# Patient Record
Sex: MALE | Race: BLACK OR AFRICAN AMERICAN | NOT HISPANIC OR LATINO | Employment: OTHER | URBAN - METROPOLITAN AREA
[De-identification: names, ages, dates, MRNs, and addresses within clinical notes are randomized per-mention and may not be internally consistent; named-entity substitution may affect disease eponyms.]

---

## 2017-09-18 ENCOUNTER — TELEPHONE (OUTPATIENT)
Dept: SURGERY | Facility: CLINIC | Age: 67
End: 2017-09-18

## 2017-09-20 ENCOUNTER — INITIAL CONSULT (OUTPATIENT)
Dept: SURGICAL ONCOLOGY | Facility: CLINIC | Age: 67
End: 2017-09-20
Payer: MEDICARE

## 2017-09-20 VITALS
WEIGHT: 145.06 LBS | HEART RATE: 78 BPM | SYSTOLIC BLOOD PRESSURE: 132 MMHG | HEIGHT: 65 IN | BODY MASS INDEX: 24.17 KG/M2 | TEMPERATURE: 98 F | DIASTOLIC BLOOD PRESSURE: 67 MMHG

## 2017-09-20 DIAGNOSIS — C20 RECTAL CANCER METASTASIZED TO LIVER: ICD-10-CM

## 2017-09-20 DIAGNOSIS — C78.7 RECTAL CANCER METASTASIZED TO LIVER: ICD-10-CM

## 2017-09-20 PROCEDURE — 1159F MED LIST DOCD IN RCRD: CPT | Mod: ,,, | Performed by: SURGERY

## 2017-09-20 PROCEDURE — 99204 OFFICE O/P NEW MOD 45 MIN: CPT | Mod: S$PBB,,, | Performed by: SURGERY

## 2017-09-20 PROCEDURE — 99999 PR PBB SHADOW E&M-EST. PATIENT-LVL III: CPT | Mod: PBBFAC,,, | Performed by: SURGERY

## 2017-09-20 PROCEDURE — 99213 OFFICE O/P EST LOW 20 MIN: CPT | Mod: PBBFAC,PN | Performed by: SURGERY

## 2017-09-20 PROCEDURE — 1125F AMNT PAIN NOTED PAIN PRSNT: CPT | Mod: ,,, | Performed by: SURGERY

## 2017-09-20 RX ORDER — ALLOPURINOL 300 MG/1
300 TABLET ORAL
COMMUNITY
Start: 2014-08-25

## 2017-09-20 RX ORDER — AMLODIPINE BESYLATE 5 MG/1
5 TABLET ORAL
COMMUNITY
Start: 2015-03-11

## 2017-09-20 RX ORDER — ATENOLOL 50 MG/1
50 TABLET ORAL
COMMUNITY
Start: 2015-04-01

## 2017-09-20 RX ORDER — OMEPRAZOLE 40 MG/1
40 CAPSULE, DELAYED RELEASE ORAL
COMMUNITY
Start: 2015-04-09

## 2017-09-20 RX ORDER — SIMVASTATIN 40 MG/1
TABLET, FILM COATED ORAL
COMMUNITY
Start: 2014-11-03

## 2017-09-20 RX ORDER — CYPROHEPTADINE HYDROCHLORIDE 4 MG/1
4 TABLET ORAL
COMMUNITY
Start: 2015-03-11

## 2017-09-20 RX ORDER — TRAMADOL HYDROCHLORIDE 50 MG/1
50 TABLET ORAL
COMMUNITY

## 2017-09-20 RX ORDER — OXYBUTYNIN CHLORIDE 5 MG/1
5 TABLET ORAL
COMMUNITY
Start: 2014-11-25

## 2017-09-20 RX ORDER — LOSARTAN POTASSIUM 50 MG/1
50 TABLET ORAL
COMMUNITY
Start: 2014-04-25

## 2017-09-20 NOTE — LETTER
September 20, 2017      Manpreet Cazares MD  [address]  [address]  [facility] [address]           [facility]  [address]  [facility] [place] [facility] [address]  Phone: [phone]  Fax: [phone]          Patient: Rajat Ramires   MR Number: [id number]   YOB: 1950   Date of Visit: 9/20/2017       Dear Dr. Manpreet Cazares:    Thank you for referring Rajat Ramires to me for evaluation. Attached you will find relevant portions of my assessment and plan of care.    If you have questions, please do not hesitate to call me. I look forward to following Rajat Ramires along with you.    Sincerely,    Sander Jean MD    Enclosure  CC:  No Recipients    If you would like to receive this communication electronically, please contact [email or web address] or [phone] to request more information on [employer] access.    For providers and/or their staff who would like to refer a patient to [facility], please contact us through our one-stop-shop provider referral line, [facility], at [phone].    If you feel you have received this communication in error or would no longer like to receive these types of communications, please e-mail [email or web address]

## 2017-09-20 NOTE — PROGRESS NOTES
Recently diagnosed with locally advanced rectal cancer and is undergoing staging workup  To be presented at [facility] cancer conference today  MRI shows (on my review) four liver mets:  ---segment LUIS EDUARDO, 2.5 cm, abutting the middle hepatic vein (will probably require full left hep res)  ---segment 8, 1 cm and fairly peripheral  ---segment 6/7 watershed, 1 cm, fairly peripheral  ---left caudate, 1.5 cm    The latter 3 lesion would be amenable to NAW resection    PET scan also shows mildly abnormal mediastinal nodes and involved right iliac nodes    No history of liver disease, per patient  Former heavy drinker but has quit  No history of hepatitis    Lives in [place]. Travel is difficult for him    Vitals:    09/20/17 1010   BP: 132/67   Pulse: 78   Temp: 98.3 °F (36.8 °C)     Anicteric sclera  Chest clear to ausc  Heart: RR&R with no m  Abd: soft, non-tender, liver not palpable  Ext: normal  Neuro: normal    Imp/Rec:  The liver metastases are potentially resectable, but this would only make sense if the pelvic disease is completely resectable AND the mediastinal nodes are not involved with metastatic rectal cancer.   He would need hepatitis panel, LFTs, INR prior to finalizing decision about liver resection  D/W Mr Ramires and his     Copy Dr Cazares

## 2017-11-22 ENCOUNTER — TREATMENT PLANNING (OUTPATIENT)
Dept: SURGERY | Facility: HOSPITAL | Age: 67
End: 2017-11-22

## 2017-11-22 NOTE — PROGRESS NOTES
Mr Ramires has undergone APR recently (he received xeloda/XRT preop) and is doing well. His oncologist at [facility] called today to discuss management of his liver mets. I had a chance to review his MRI at conference at [facility] about two months ago and he had, I believe, four liver metastases, including two in the right liver, and two in the left liver (including one in the uncinate). These did appear resectable. After discussion, we have decided that the next step will be 4-6 cycles of FOLFOX-based chemotherapy and I will re-evaluate him after with liver imaging for resection of his hepatic metastases.